# Patient Record
(demographics unavailable — no encounter records)

---

## 2024-11-05 NOTE — REASON FOR VISIT
[Initial Consultation] : an initial consultation for [Family Member] : family member [FreeTextEntry2] : referred by Dr. Pretty Dempsey, Pulmonologist for sinus pain and pressure.

## 2024-11-05 NOTE — HISTORY OF PRESENT ILLNESS
[de-identified] : 66 year old female referred by Dr. Pretty Dempsey, Pulmonologist for sinus pain and pressure.  Reports sinus pressure and pain since she started 1 year ago since she stared her ellipta. States started arykace inhaler 3-4 months ago.  Reports nasal congestion, post nasal drip especially in the evening. Denies nasal discharge.  Denies sinus infection in the past year. Use of steroidal nasal sprays and saline nasal rinses in the past without much relief.  Denies CT scan of sinuses in

## 2024-11-05 NOTE — ASSESSMENT
[FreeTextEntry1] : 66 year F present with Chronic Sinusitis, NSD   Nasal endoscopy shows Right DNS, Moderate Inferior Turbinates Hypertrophy, No polyps, purulence or masses, Posterior Nasal pharynx Cobblestone/Clear Drainage, Moderate mucus production coating nasal cavity   Recommend: Chronic Sinusitis / PND -Discussed the importance of rinsing the sinus before using nasal spray so that excess mucus lining the nasal cavity can be wash away so medication can penetration the nose. -Discussed Post-nasal drip occurs when mucus from the nasal passages and sinuses drains into the throat. If the mucus becomes thick, post-nasal drip can cause problems such as a sore throat, laryngitis, a cough, bad breath, hoarseness, and sometimes wheezing. -Discussed the importance of rinsing the sinus before using nasal spray so that excess mucus lining the nasal cavity can be wash away so medication can penetration the nose. -Start Sinus Rinse + Budesonide BID -If medication rinse does not work can consider CT Sinus  Nasal septal deviation -Discussed deviated septum occurs when the thin wall between your nasal passages is displaced to one side. When the nasal septum is off-center it makes one nasal passage smaller. -If a deviated septum is severe, it can block one side of the nose and reduce airflow, causing difficulty breathing. -The exposure of a deviated septum to the drying effect of airflow through the nose may sometimes contribute to crusting or bleeding in certain people. -Treatment of nasal obstruction includes nasal steroids to reduce the swelling. -If conservative medical management is not effective correction of the deviated septum through surgery may be needed.  -Return to clinic in 3 months or sooner if new/worsen symptoms present

## 2024-11-05 NOTE — HISTORY OF PRESENT ILLNESS
[de-identified] : 66 year old female referred by Dr. Pretty Dempsey, Pulmonologist for sinus pain and pressure.  Reports sinus pressure and pain since she started 1 year ago since she stared her ellipta. States started arykace inhaler 3-4 months ago.  Reports nasal congestion, post nasal drip especially in the evening. Denies nasal discharge.  Denies sinus infection in the past year. Use of steroidal nasal sprays and saline nasal rinses in the past without much relief.  Denies CT scan of sinuses in

## 2024-12-10 NOTE — END OF VISIT
[Time Spent: ___ minutes] : I have spent [unfilled] minutes of time on the encounter which excludes teaching and separately reported services. Respiratory

## 2024-12-10 NOTE — ASSESSMENT
[FreeTextEntry1] : Ms. Hickey is a 67 yo F with PMHx GERD, bronchiectasis, asthma, pulmonary MAC, HTN, HLD, hiatal hernia whom is known to me from recent hospitalization in early 12/2023 for asthma exacerbation i/s/o pneumonia. She was previously followed by another Pulmonologist who had initiated her on treatment for Pulmonary MAC in 09/2023 with Rifabutin/Azithromycin/Ethambutol/Levaquin 3x weekly and inh Tobramycin for isolation of Pseudomonas. She has since been changed to R/E/A therapy with twice daily ACT. Has had PND and seasonal allergy symptoms eliciting upper airway cough syndrome however improved. Had M. avium positive sputum in May, June, July 2024 despite GDT and has been started on Arikayce as of 07/2024. Discussed common SEs she is experiencing including sore throat/cough/sputum production.  Recommend: #Asthma - continue Anoro ellipta - FENO/PFTS prior to next visit  #Bronchiectasis/MAC - Continue Rifampin 600mg/Azithromycin 500mg/Ethambutol 1200mg M/W/F but space out, take Azithromycin in AM, Ethambutol in afternoon and Rifampin before bed to help with GI related SEs - Arikayce added mid July 2024 d/t persistent culture positivity (May, June, July '24) - positive MAC culture 10/3/24, will continue to monitor - Seen by Ophtho 1/21/24 no e/o optic neuritis, unclear when last visit was, has appt 01/2025 - obtain monthly sputum for routine bacterial culture as well as AFB smear/cx (cups provided) - continue ACT with Duonebs + change hypersal from 3.5% to 7% (given wheezing and retained secretions) at least once daily, ideally twice daily when has more secretions, using Aerobika right after - completed Audiology testing earlier this year but needs to perform every 3-4 months, to make appt ASAP - cont Folplex 2.2 MVI for fatigue - CBC/CMP, today - EKG today - Chest CT this month, will discuss whether candidate for surgical resection after review of f/u CT  #Chronic sinusitis - seen by ENT Dr. Cintron 10/29/24 - holding Flonase/Azelastine - started nasal sinus rinses with saline then Budesonide BID  #GERD - continue PPI, now on Omeprazole, patient taking 3x weekly - s/p EGD Dec/[place] '24, hiatal hernia only abnormality identified - follow-up with GI (Dr. Viet Rosales, [place] office, [address] [facility], [address], [place] [address], Ph [phone], Fax [phone])  #Seasonal allergies - resume Claritin SL  #Hx of Pseudomonas? -rare pan sensitive Pseud on 01/2024 culture, 3/11 culture cancelled, continue to monitor - last culture negative   RTC in 3 months, sooner if any change in symptoms.  Pretty Dempsey MD, MSCR.

## 2024-12-10 NOTE — HISTORY OF PRESENT ILLNESS
[Never] : never [TextBox_4] : Current HPI:  Reports doing well.  Had not eaten much for last 2 days due to prep for Colonoscopy and noted NO cough during that time.  Still with GERD symptoms, GI did not want her to increase PPI above 3 x weekly.  Notes performing ACT once daily.  Still with a lot of sputum production post Arikacye but limited post nebulization and no dyspnea.  Has visual testing for 01/2025.  Saw ENT for chronic sinusitis and recommended nasal rinse + budesonide, now off Flonase.  Has not repeat Audiology testing but denies changes in hearing or tinnitus.   (9/10/24): Returns for follow-up. Started Arikayce in mid July 2024. Started coughing a lot more, bringing up green sputum, somewhat less now but still with more cough throughout the day then before she started. Also reporting hoarseness and throat pain - has occasionally coughed up some blood streaked sputum which knows is coming from irritated throat. Skipped Sunday dose has otherwise been performing daily. Has not been performing ACT with Albuterol + Hpersal recently for reasons above. Also noting some pruritus along inner thighs when nebulizing but no rash. Has increased sinus pressure/congestion not currently on an antihistamine. Using Flonase once daily, noting increased throat clearing in evening and overnight. Asking about different formulation for Ethambutol as believes tablets are contributing to stomach irritation/GI upset. Is taking all three abx in AM. No increased SOB. No fever, chills, night sweats or weight loss. Also c/o MSK back pain when washing dishes, does yoga 2x per week.   (6/20/24): Returns for follow-up. Has been doing well except for complaint of back pain - usually positional when sitting for too long, improves with stretching however also reports sometimes noticing after performing nebulizer treatments. Minimal to no cough. No shortness of breath. No fever,. chills, night sweats or weight loss. No changes in vision. No changes in hearing, notes occasional tinnitus but resolves after a few seconds. Remains on R/E/A therapy. Submitted sputum in March which was AFB negative, however May sputum AFB smear negative but just reported growth of AFB.   (4/18/24): Ms. Hickey presents for follow-up accompanied by her . Reports still with intermittent coughing exacerbations. Notes these occur when she gets angry/frustrated/overwhelmed. Also seem to be accompanied by a chest tightness and reflux symptoms. Notes soreness in her throat which improves with gargling and worsened by her coughing fits. No fever, no chills. Not SOB at rest. Some increased dyspnea when taking the stairs. Also concerned about her fatigue and lack of motivation to perform activities around the home but also noting that she has had a lot going on at home and with her family (husbands knee surgery 5 weeks ago, death of her mother 01/2024 and issues with siblings in [facility] related to her property). She became easily tearful. She is on PPI every other day but still with GERD symptoms. Sinus/nasal symptoms under better control on daily Claritin/Flonase. Performing ACT twice daily.   (4/5/24): Ms. Hickey reports increased, nonproductive, dry cough over the last several day. She continues to take all her antibiotics and has been performing her Albuterol + Hypersal neb treatments twice daily. She has noted increased nasal drainage/post nasal drip - has not been regularly using her Azelastine spray noting that it can cause increased sinus pressure at times. She notes having some increased allergy symptoms which improve when she takes her Claritin but has also not been taking this routinely. She denies fever, chills. She has been using her Anoro ellipta inhaler with good control of her symptoms. She notes increased shortness of breath at time but then admits this might be more in the setting of anxiety. Denies wheezing or increased use of her rescue inhaler or nebulizer.   (3/11/24): Reports doing well. Tolerating all her medications. Seen by Ophthalmology end of January with no visual disturbances (report received but without objective testing results). Also seen by Audiology last month but no report available however per patient hearing fine. She has been taking her R/E/A three times weekly (medication dosing reviewed). She has also been performing her ACT but only 3 times weekly at best as reports feeling a bit "shaky" when she uses the Albuterol nebulizer - sometimes she will only use the hypersal. Feels better overall. Has gained weight to 120lbs but baseline closer to 130lbs. She is using her Anoro ellipta inhaler and has had no need for her rescue inhaler, asthma symptoms appear to be well controlled.   (1/18/24): Ms. Hickey is a 65 yo F with PMHx GERD, bronchiectasis, asthma, pulmonary MAC, HTN, HLD, hiatal hernia whom is known to me from recent hospitalization in early 12/2023 for asthma exacerbation i/s/o pneumonia. She was previously followed by another Pulmonologist who had initiated her on treatment for Pulmonary MAC in 09/2023 with Rifabutin/Azithromycin/Ethambutol/Levaquin 3x weekly and inh Tobramycin for isolation of Pseudomonas. During this most recent hospitalization she was treated with Cefepime and then discharged on 10 days of Levaquin. During hospitalization she was noted to have tachycardia 110-120s and was discharged on an ACT regimen with Ipratropium q6h and Hypersal q12h. She also takes Symbicort BID. She was recently evaluated by GI who started her on a PPI. She has continued to take Rifampin 600mg/Ethambutol 1200mg/Azithromycin 500mg 3x weekly. She does report intermittent tinnitus but denies hearing changes. She has also noted decreased visual acuity since starting her abx and canceled her recent Ophthalmology appt. She has not had Audiology or visual testing since starting GBT for MAC. Otherwise she reports her breathing is under control, does use Albuterol 1-2x daily when she feels a productive cough coming on because it helps her clear her mucus. Otherwise no fever, chills, chest pain, night sweats. Her weight is 116lbs from 126lbs 06/2023. Her baseline weight is around 120-122lbs.

## 2024-12-10 NOTE — PHYSICAL EXAM
[No Acute Distress] : no acute distress [Well Nourished] : well nourished [Well Groomed] : well groomed [Well Developed] : well developed [Normal Oropharynx] : normal oropharynx [Normal Appearance] : normal appearance [No Neck Mass] : no neck mass [Normal Rate/Rhythm] : normal rate/rhythm [Normal S1, S2] : normal s1, s2 [No Murmurs] : no murmurs [No Resp Distress] : no resp distress [No Acc Muscle Use] : no acc muscle use [Normal Rhythm and Effort] : normal rhythm and effort [Wheeze] : wheeze [No Abnormalities] : no abnormalities [Benign] : benign [Normal Gait] : normal gait [No Clubbing] : no clubbing [No Cyanosis] : no cyanosis [No Edema] : no edema [Normal Color/ Pigmentation] : normal color/ pigmentation [No Focal Deficits] : no focal deficits [Oriented x3] : oriented x3 [Normal Affect] : normal affect

## 2024-12-10 NOTE — REASON FOR VISIT
[Follow-Up] : a follow-up visit [Bronchiectasis] : bronchiectasis [Spouse] : spouse [TextBox_44] : NTM

## 2024-12-10 NOTE — REVIEW OF SYSTEMS
[Fever] : no fever [Fatigue] : no fatigue [Chills] : no chills [Poor Appetite] : no poor appetite [Sore Throat] : no sore throat [Nasal Congestion] : no nasal congestion [Postnasal Drip] : no postnasal drip [Cough] : cough [Chest Tightness] : chest tightness [Sputum] : no sputum [Dyspnea] : no dyspnea [Pleuritic Pain] : no pleuritic pain [Wheezing] : wheezing [SOB on Exertion] : no sob on exertion [Chest Discomfort] : no chest discomfort [Edema] : no edema [Palpitations] : no palpitations [GERD] : gerd [Abdominal Pain] : no abdominal pain [Nausea] : no nausea [Vomiting] : no vomiting [Dysuria] : no dysuria [Arthralgias] : no arthralgias [Myalgias] : no myalgias [Rash] : no rash [Diabetes] : no diabetes [Obesity] : no obesity [Negative] : Psychiatric

## 2025-01-09 NOTE — HISTORY OF PRESENT ILLNESS
[FreeTextEntry1] : 67 year old referred for audio by pulmonology- dx mycobacterium avium complex (MAC) treated with Azithromycin- pt has been on medication for about 1 year. Reports difficulty hearing left ear, feels hearing has worsened since taking medication. Denies tinnitus

## 2025-01-09 NOTE — PLAN
[FreeTextEntry2] : 1. Annual audio re evaluation to monitor or sooner as per medication protocol. 2. Consider binaural HA fitting pending patient motivation.

## 2025-01-09 NOTE — ASSESSMENT
[FreeTextEntry1] : Counseled patient regarding results obtained today. Pt reports that she notices hearing fluctuates with associated sinus pressure. Advised that today she is showing type A tympanograms with symmetrical hearing however very possibly  could change with some congestion, pt expressed understanding of all.

## 2025-01-09 NOTE — PROCEDURE
[] : Acoustic Immittance: [Type A Tympanogram] : Type A Normal [] : Complete Audiological Evaluation [Good] : good [Headphones] : headphones [de-identified] : Results consistent with hearing within normal limits from 250 Hz- 2000 Hz sloping to mild SNHL thereafter bilaterally. Excellent SRS bilaterally.

## 2025-01-28 NOTE — HISTORY OF PRESENT ILLNESS
[de-identified] : 66 year old female follow up for sinus pain and pressure.  Reports using budesonide and nasal rinse 2x daily.  States post nasal drip and phlegm is now clear- improved since last visit.  States has left sided sinus pressure, watery eyes.  States currently taking azithromycin 3x weekly- as per pulm. 
Never smoker

## 2025-01-28 NOTE — REASON FOR VISIT
[Subsequent Evaluation] : a subsequent evaluation for [Family Member] : family member [FreeTextEntry2] : sinus pain and pressure.

## 2025-01-28 NOTE — ASSESSMENT
[FreeTextEntry1] : 66 year F present with Chronic Sinusitis, NSD, PND   Nasal endoscopy shows Right DNS, Moderate Inferior Turbinates Hypertrophy, No polyps, purulence or masses, Posterior Nasal pharynx Cobblestone/Clear Drainage, Mild mucus production coating nasal cavity   Recommend: Chronic Sinusitis / PND -Discussed the importance of rinsing the sinus before using nasal spray so that excess mucus lining the nasal cavity can be wash away so medication can penetration the nose. -Discussed Post-nasal drip occurs when mucus from the nasal passages and sinuses drains into the throat. If the mucus becomes thick, post-nasal drip can cause problems such as a sore throat, laryngitis, a cough, bad breath, hoarseness, and sometimes wheezing. -Discussed the importance of rinsing the sinus before using nasal spray so that excess mucus lining the nasal cavity can be wash away so medication can penetration the nose. -Continue Sinus Rinse + Budesonide BID -Start Ipratropium PRN -States would like to keep doing the medication route. Not interested in surgery at this point  Nasal septal deviation -Discussed deviated septum occurs when the thin wall between your nasal passages is displaced to one side. When the nasal septum is off-center it makes one nasal passage smaller. -If a deviated septum is severe, it can block one side of the nose and reduce airflow, causing difficulty breathing. -The exposure of a deviated septum to the drying effect of airflow through the nose may sometimes contribute to crusting or bleeding in certain people. -Treatment of nasal obstruction includes nasal steroids to reduce the swelling. -If conservative medical management is not effective correction of the deviated septum through surgery may be needed.  -Return to clinic in 3 months or sooner if new/worsen symptoms present

## 2025-02-11 NOTE — ASSESSMENT
[FreeTextEntry1] : Ms. Hickey is a 68 yo F with PMHx GERD, bronchiectasis, asthma, pulmonary MAC, HTN, HLD, hiatal hernia whom is known to me from recent hospitalization in early 12/2023 for asthma exacerbation i/s/o pneumonia. She was previously followed by another Pulmonologist who had initiated her on treatment for Pulmonary MAC in 09/2023 with Rifabutin/Azithromycin/Ethambutol/Levaquin 3x weekly and inh Tobramycin for isolation of Pseudomonas. She has since been changed to R/E/A therapy with twice daily ACT. Has had PND and seasonal allergy symptoms eliciting upper airway cough syndrome however improved. Had M. avium positive sputum in May, June, July 2024 despite GDT and has been started on Arikayce as of 07/2024. Discussed common SEs she is experiencing including sore throat/cough/sputum production.  Recommend: #Asthma - continue Anoro ellipta - exp squeaks on exam, not symptomatic - FENO = 15 at todays visit - PFTs from 1/27/25 with moderate obstruction and gas trapping - would like to re-trial Singulair (says has not worked for her in past but will try again for limited time) given exam findings/PFTs  #Bronchiectasis/MAC - Continue Rifampin 600mg/Azithromycin 500mg/Ethambutol 1200mg M/W/F but space out, take Azithromycin in AM, Ethambutol in afternoon and Rifampin before bed (has helped with GI related SEs) - Arikayce added mid July 2024 d/t persistent culture positivity (May, June, July '24) - positive MAC culture 10/3/24, and again in 12/2024 x 3 which is disconcerting, will need to repeat susceptibility testing - Seen by Ophtho , continue every 3-4 months - obtain monthly sputum for routine bacterial culture as well as AFB smear/cx (cups provided) - continue ACT with Duonebs + hypersal  7% (given wheezing and retained secretions) at least once daily, ideally twice daily when has more secretions, using Aerobika right after - completed Audiology testing , continue every 3-4mos - cont Folplex 2.2 MVI for fatigue - CBC/CMP, today - EKG deferred as previously stable - Chest CT stable  #Chronic sinusitis, persistent PND - seen by ENT Dr. Cintron who discussed surgery but patient opting for medical mgmt - nasal sinus rinses with saline then Budesonide BID - recommended to add ipratropium NS - would like to obtain AFB culture from sinuses in addition to bacterial culture at next opportunity  #GERD - continue PPI, now on Omeprazole, patient taking 3x weekly - s/p EGD Dec/[place] '24, hiatal hernia only abnormality identified - follow-up with GI (Dr. Viet Rosales, [place] office, [address] [facility], [address], [place] [address], Ph [phone], Fax [phone])  #Seasonal allergies - resume Claritin SL when symptomatic  #Hx of Pseudomonas? -rare pan sensitive Pseud on 01/2024 culture - last bacterial culture negative 06/2024, continue to monitor   RTC in 3 months, sooner if any change in symptoms.  Pretty Dempsey MD, MSCR.

## 2025-02-11 NOTE — HISTORY OF PRESENT ILLNESS
[TextBox_4] : Current HPI: Reports doing well.  Has started exercising again.  Notes no cough.  Minimal sputum production.   No longer having back discomfort with breathing.  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  Seen by Ophtho and Audiology recently as well as ENT.  ENT noted right sinus impaction - discussed surgery but prefers to continue with medical mgmt at this point.  Still having nasal drainage which is mainly what contributes to an intermittent cough.  (12/10/24): Reports doing well. Had not eaten much for last 2 days due to prep for Colonoscopy and noted NO cough during that time. Still with GERD symptoms, GI did not want her to increase PPI above 3 x weekly. Notes performing ACT once daily. Still with a lot of sputum production post Arikacye but limited post nebulization and no dyspnea. Has visual testing for 01/2025. Saw ENT for chronic sinusitis and recommended nasal rinse + budesonide, now off Flonase. Has not repeat Audiology testing but denies changes in hearing or tinnitus.   (9/10/24): Returns for follow-up. Started Arikayce in mid July 2024. Started coughing a lot more, bringing up green sputum, somewhat less now but still with more cough throughout the day then before she started. Also reporting hoarseness and throat pain - has occasionally coughed up some blood streaked sputum which knows is coming from irritated throat. Skipped Sunday dose has otherwise been performing daily. Has not been performing ACT with Albuterol + Hpersal recently for reasons above. Also noting some pruritus along inner thighs when nebulizing but no rash. Has increased sinus pressure/congestion not currently on an antihistamine. Using Flonase once daily, noting increased throat clearing in evening and overnight. Asking about different formulation for Ethambutol as believes tablets are contributing to stomach irritation/GI upset. Is taking all three abx in AM. No increased SOB. No fever, chills, night sweats or weight loss. Also c/o MSK back pain when washing dishes, does yoga 2x per week.   (6/20/24): Returns for follow-up. Has been doing well except for complaint of back pain - usually positional when sitting for too long, improves with stretching however also reports sometimes noticing after performing nebulizer treatments. Minimal to no cough. No shortness of breath. No fever,. chills, night sweats or weight loss. No changes in vision. No changes in hearing, notes occasional tinnitus but resolves after a few seconds. Remains on R/E/A therapy. Submitted sputum in March which was AFB negative, however May sputum AFB smear negative but just reported growth of AFB.   (4/18/24): Ms. Hickey presents for follow-up accompanied by her . Reports still with intermittent coughing exacerbations. Notes these occur when she gets angry/frustrated/overwhelmed. Also seem to be accompanied by a chest tightness and reflux symptoms. Notes soreness in her throat which improves with gargling and worsened by her coughing fits. No fever, no chills. Not SOB at rest. Some increased dyspnea when taking the stairs. Also concerned about her fatigue and lack of motivation to perform activities around the home but also noting that she has had a lot going on at home and with her family (husbands knee surgery 5 weeks ago, death of her mother 01/2024 and issues with siblings in [facility] related to her property). She became easily tearful. She is on PPI every other day but still with GERD symptoms. Sinus/nasal symptoms under better control on daily Claritin/Flonase. Performing ACT twice daily.   (4/5/24): Ms. Hickey reports increased, nonproductive, dry cough over the last several day. She continues to take all her antibiotics and has been performing her Albuterol + Hypersal neb treatments twice daily. She has noted increased nasal drainage/post nasal drip - has not been regularly using her Azelastine spray noting that it can cause increased sinus pressure at times. She notes having some increased allergy symptoms which improve when she takes her Claritin but has also not been taking this routinely. She denies fever, chills. She has been using her Anoro ellipta inhaler with good control of her symptoms. She notes increased shortness of breath at time but then admits this might be more in the setting of anxiety. Denies wheezing or increased use of her rescue inhaler or nebulizer.   (3/11/24): Reports doing well. Tolerating all her medications. Seen by Ophthalmology end of January with no visual disturbances (report received but without objective testing results). Also seen by Audiology last month but no report available however per patient hearing fine. She has been taking her R/E/A three times weekly (medication dosing reviewed). She has also been performing her ACT but only 3 times weekly at best as reports feeling a bit "shaky" when she uses the Albuterol nebulizer - sometimes she will only use the hypersal. Feels better overall. Has gained weight to 120lbs but baseline closer to 130lbs. She is using her Anoro ellipta inhaler and has had no need for her rescue inhaler, asthma symptoms appear to be well controlled.   (1/18/24): Ms. Hickey is a 67 yo F with PMHx GERD, bronchiectasis, asthma, pulmonary MAC, HTN, HLD, hiatal hernia whom is known to me from recent hospitalization in early 12/2023 for asthma exacerbation i/s/o pneumonia. She was previously followed by another Pulmonologist who had initiated her on treatment for Pulmonary MAC in 09/2023 with Rifabutin/Azithromycin/Ethambutol/Levaquin 3x weekly and inh Tobramycin for isolation of Pseudomonas. During this most recent hospitalization she was treated with Cefepime and then discharged on 10 days of Levaquin. During hospitalization she was noted to have tachycardia 110-120s and was discharged on an ACT regimen with Ipratropium q6h and Hypersal q12h. She also takes Symbicort BID. She was recently evaluated by GI who started her on a PPI. She has continued to take Rifampin 600mg/Ethambutol 1200mg/Azithromycin 500mg 3x weekly. She does report intermittent tinnitus but denies hearing changes. She has also noted decreased visual acuity since starting her abx and canceled her recent Ophthalmology appt. She has not had Audiology or visual testing since starting GBT for MAC. Otherwise she reports her breathing is under control, does use Albuterol 1-2x daily when she feels a productive cough coming on because it helps her clear her mucus. Otherwise no fever, chills, chest pain, night sweats. Her weight is 116lbs from 126lbs 06/2023. Her baseline weight is around 120-122lbs.

## 2025-02-11 NOTE — PHYSICAL EXAM
[No Acute Distress] : no acute distress [Well Nourished] : well nourished [Well Groomed] : well groomed [Well Developed] : well developed [Normal Oropharynx] : normal oropharynx [Normal Appearance] : normal appearance [No Neck Mass] : no neck mass [Normal Rate/Rhythm] : normal rate/rhythm [Normal S1, S2] : normal s1, s2 [No Murmurs] : no murmurs [No Resp Distress] : no resp distress [No Acc Muscle Use] : no acc muscle use [Normal Rhythm and Effort] : normal rhythm and effort [No Abnormalities] : no abnormalities [Benign] : benign [Soft] : soft [Normal Gait] : normal gait [No Clubbing] : no clubbing [No Cyanosis] : no cyanosis [No Edema] : no edema [Normal Color/ Pigmentation] : normal color/ pigmentation [No Focal Deficits] : no focal deficits [Oriented x3] : oriented x3 [Normal Insight/judgment] : normal insight/judgment [Normal Affect] : normal affect [TextBox_68] : bilateral exp squeaks

## 2025-02-11 NOTE — ASSESSMENT
[FreeTextEntry1] : Ms. Hickey is a 68 yo F with PMHx GERD, bronchiectasis, asthma, pulmonary MAC, HTN, HLD, hiatal hernia whom is known to me from recent hospitalization in early 12/2023 for asthma exacerbation i/s/o pneumonia. She was previously followed by another Pulmonologist who had initiated her on treatment for Pulmonary MAC in 09/2023 with Rifabutin/Azithromycin/Ethambutol/Levaquin 3x weekly and inh Tobramycin for isolation of Pseudomonas. She has since been changed to R/E/A therapy with twice daily ACT. Has had PND and seasonal allergy symptoms eliciting upper airway cough syndrome however improved. Had M. avium positive sputum in May, June, July 2024 despite GDT and has been started on Arikayce as of 07/2024. Discussed common SEs she is experiencing including sore throat/cough/sputum production.  Recommend: #Asthma - continue Anoro ellipta - exp squeaks on exam, not symptomatic - FENO = 15 at todays visit - PFTs from 1/27/25 with moderate obstruction and gas trapping - would like to re-trial Singulair (says has not worked for her in past but will try again for limited time) given exam findings/PFTs  #Bronchiectasis/MAC - Continue Rifampin 600mg/Azithromycin 500mg/Ethambutol 1200mg M/W/F but space out, take Azithromycin in AM, Ethambutol in afternoon and Rifampin before bed (has helped with GI related SEs) - Arikayce added mid July 2024 d/t persistent culture positivity (May, June, July '24) - positive MAC culture 10/3/24, and again in 12/2024 x 3 which is disconcerting, will need to repeat susceptibility testing - Seen by Ophtho , continue every 3-4 months - obtain monthly sputum for routine bacterial culture as well as AFB smear/cx (cups provided) - continue ACT with Duonebs + hypersal  7% (given wheezing and retained secretions) at least once daily, ideally twice daily when has more secretions, using Aerobika right after - completed Audiology testing , continue every 3-4mos - cont Folplex 2.2 MVI for fatigue - CBC/CMP, today - EKG deferred as previously stable - Chest CT stable  #Chronic sinusitis, persistent PND - seen by ENT Dr. Cintron who discussed surgery but patient opting for medical mgmt - nasal sinus rinses with saline then Budesonide BID - recommended to add ipratropium NS - would like to obtain AFB culture from sinuses in addition to bacterial culture at next opportunity  #GERD - continue PPI, now on Omeprazole, patient taking 3x weekly - s/p EGD Dec/[place] '24, hiatal hernia only abnormality identified - follow-up with GI (Dr. Viet Rosales, [place] office, [id number] [facility], [address], [place] [address], Ph [phone], Fax [phone])  #Seasonal allergies - resume Claritin SL when symptomatic  #Hx of Pseudomonas? -rare pan sensitive Pseud on 01/2024 culture - last bacterial culture negative 06/2024, continue to monitor   RTC in 3 months, sooner if any change in symptoms.  Pretty Dempsey MD, MSCR.

## 2025-02-11 NOTE — HISTORY OF PRESENT ILLNESS
[TextBox_4] : Current HPI: Reports doing well.  Has started exercising again.  Notes no cough.  Minimal sputum production.   No longer having back discomfort with breathing.  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  Seen by Ophtho and Audiology recently as well as ENT.  ENT noted right sinus impaction - discussed surgery but prefers to continue with medical mgmt at this point.  Still having nasal drainage which is mainly what contributes to an intermittent cough.  (12/10/24): Reports doing well. Had not eaten much for last 2 days due to prep for Colonoscopy and noted NO cough during that time. Still with GERD symptoms, GI did not want her to increase PPI above 3 x weekly. Notes performing ACT once daily. Still with a lot of sputum production post Arikacye but limited post nebulization and no dyspnea. Has visual testing for 01/2025. Saw ENT for chronic sinusitis and recommended nasal rinse + budesonide, now off Flonase. Has not repeat Audiology testing but denies changes in hearing or tinnitus.   (9/10/24): Returns for follow-up. Started Arikayce in mid July 2024. Started coughing a lot more, bringing up green sputum, somewhat less now but still with more cough throughout the day then before she started. Also reporting hoarseness and throat pain - has occasionally coughed up some blood streaked sputum which knows is coming from irritated throat. Skipped Sunday dose has otherwise been performing daily. Has not been performing ACT with Albuterol + Hpersal recently for reasons above. Also noting some pruritus along inner thighs when nebulizing but no rash. Has increased sinus pressure/congestion not currently on an antihistamine. Using Flonase once daily, noting increased throat clearing in evening and overnight. Asking about different formulation for Ethambutol as believes tablets are contributing to stomach irritation/GI upset. Is taking all three abx in AM. No increased SOB. No fever, chills, night sweats or weight loss. Also c/o MSK back pain when washing dishes, does yoga 2x per week.   (6/20/24): Returns for follow-up. Has been doing well except for complaint of back pain - usually positional when sitting for too long, improves with stretching however also reports sometimes noticing after performing nebulizer treatments. Minimal to no cough. No shortness of breath. No fever,. chills, night sweats or weight loss. No changes in vision. No changes in hearing, notes occasional tinnitus but resolves after a few seconds. Remains on R/E/A therapy. Submitted sputum in March which was AFB negative, however May sputum AFB smear negative but just reported growth of AFB.   (4/18/24): Ms. Hickey presents for follow-up accompanied by her . Reports still with intermittent coughing exacerbations. Notes these occur when she gets angry/frustrated/overwhelmed. Also seem to be accompanied by a chest tightness and reflux symptoms. Notes soreness in her throat which improves with gargling and worsened by her coughing fits. No fever, no chills. Not SOB at rest. Some increased dyspnea when taking the stairs. Also concerned about her fatigue and lack of motivation to perform activities around the home but also noting that she has had a lot going on at home and with her family (husbands knee surgery 5 weeks ago, death of her mother 01/2024 and issues with siblings in [facility] related to her property). She became easily tearful. She is on PPI every other day but still with GERD symptoms. Sinus/nasal symptoms under better control on daily Claritin/Flonase. Performing ACT twice daily.   (4/5/24): Ms. Hickey reports increased, nonproductive, dry cough over the last several day. She continues to take all her antibiotics and has been performing her Albuterol + Hypersal neb treatments twice daily. She has noted increased nasal drainage/post nasal drip - has not been regularly using her Azelastine spray noting that it can cause increased sinus pressure at times. She notes having some increased allergy symptoms which improve when she takes her Claritin but has also not been taking this routinely. She denies fever, chills. She has been using her Anoro ellipta inhaler with good control of her symptoms. She notes increased shortness of breath at time but then admits this might be more in the setting of anxiety. Denies wheezing or increased use of her rescue inhaler or nebulizer.   (3/11/24): Reports doing well. Tolerating all her medications. Seen by Ophthalmology end of January with no visual disturbances (report received but without objective testing results). Also seen by Audiology last month but no report available however per patient hearing fine. She has been taking her R/E/A three times weekly (medication dosing reviewed). She has also been performing her ACT but only 3 times weekly at best as reports feeling a bit "shaky" when she uses the Albuterol nebulizer - sometimes she will only use the hypersal. Feels better overall. Has gained weight to 120lbs but baseline closer to 130lbs. She is using her Anoro ellipta inhaler and has had no need for her rescue inhaler, asthma symptoms appear to be well controlled.   (1/18/24): Ms. Hickey is a 67 yo F with PMHx GERD, bronchiectasis, asthma, pulmonary MAC, HTN, HLD, hiatal hernia whom is known to me from recent hospitalization in early 12/2023 for asthma exacerbation i/s/o pneumonia. She was previously followed by another Pulmonologist who had initiated her on treatment for Pulmonary MAC in 09/2023 with Rifabutin/Azithromycin/Ethambutol/Levaquin 3x weekly and inh Tobramycin for isolation of Pseudomonas. During this most recent hospitalization she was treated with Cefepime and then discharged on 10 days of Levaquin. During hospitalization she was noted to have tachycardia 110-120s and was discharged on an ACT regimen with Ipratropium q6h and Hypersal q12h. She also takes Symbicort BID. She was recently evaluated by GI who started her on a PPI. She has continued to take Rifampin 600mg/Ethambutol 1200mg/Azithromycin 500mg 3x weekly. She does report intermittent tinnitus but denies hearing changes. She has also noted decreased visual acuity since starting her abx and canceled her recent Ophthalmology appt. She has not had Audiology or visual testing since starting GBT for MAC. Otherwise she reports her breathing is under control, does use Albuterol 1-2x daily when she feels a productive cough coming on because it helps her clear her mucus. Otherwise no fever, chills, chest pain, night sweats. Her weight is 116lbs from 126lbs 06/2023. Her baseline weight is around 120-122lbs.

## 2025-02-11 NOTE — REVIEW OF SYSTEMS
[Fever] : no fever [Fatigue] : no fatigue [Chills] : no chills [Nasal Congestion] : no nasal congestion [Postnasal Drip] : postnasal drip [Sinus Problems] : no sinus problems [Cough] : no cough [Chest Tightness] : no chest tightness [Sputum] : no sputum [Dyspnea] : no dyspnea [Pleuritic Pain] : no pleuritic pain [Wheezing] : no wheezing [SOB on Exertion] : no sob on exertion [Palpitations] : no palpitations [Syncope] : no syncope [Seasonal Allergies] : no seasonal allergies [GERD] : no gerd [Abdominal Pain] : no abdominal pain [Nausea] : no nausea [Vomiting] : no vomiting [Dysuria] : no dysuria [Arthralgias] : no arthralgias [Myalgias] : no myalgias [Rash] : no rash [Easy Bruising] : no easy bruising [Clotting Disorder/ Frequent bleeding] : no clotting disorder/ frequent bleeding [Headache] : no headache [Dizziness] : no dizziness [Negative] : Psychiatric

## 2025-05-01 NOTE — HISTORY OF PRESENT ILLNESS
[FreeTextEntry1] : 66 y/o F here for initial visit  Pt reports she has L cyst for a couple months.   Pt reports R hand 2nd MCP trigger finger.   Pt has not pain.   No fevers, h/a, rashes, hair loss, oral ulcers, epistaxis, sinusitis,  swollen glands, CP, SOB, cough, vision changes, abdominal pain, GERD, n/v/d, blood in stool or urine, focal weakness, sensory loss,  Raynaud's,  weight loss.  +dry eyes, uses artificial eye drops; dry mouth at night  +MAC uses anti microbials

## 2025-05-01 NOTE — PHYSICAL EXAM
[General Appearance - Well Nourished] : well nourished [General Appearance - Well Developed] : well developed [Sclera] : the sclera and conjunctiva were normal [Heart Sounds] : normal S1 and S2 [Heart Sounds Gallop] : no gallops [Murmurs] : no murmurs [Abdomen Soft] : soft [Abdomen Tenderness] : non-tender [FreeTextEntry1] : L wrist w hard mass on volar aspect [] : no rash [Skin Lesions] : no skin lesions [Oriented To Time, Place, And Person] : oriented to person, place, and time

## 2025-05-01 NOTE — ASSESSMENT
[FreeTextEntry1] : 66 y/o F w L wrist mass =painless L wrist mass on volar aspect  ddx ganglion cyst vs other soft tissue mass. Will refer to hand surgery for intervention.   Plan Hand surgery referral  RTO PRN

## 2025-05-16 NOTE — HISTORY OF PRESENT ILLNESS
[TextBox_4] : Current HPI:  Remains on MAC there with CINDY + Arikayce. Reports increased nail sensitivity x 1 month (pain near cuticles though no inflammation, does not get manicures).  No over rash but notes skin has suddenly very dry and itchy.  Also noting increased hair loss.  Has diarrhea on abx days.  She feels she gets "cold" when nebulizing her hypersal.  Stopped using her Anoro ellipta ~1 month ago because she felt it was causing palpitations.Still having symptoms of chronic rhinitis despite nasal steroids/singulair - notes sinus congestion. Also w/ increased abd bloating with milk products. Reviewed labs on patient portal (phone) performed in March 2025, TSH WNL at 2.13 at that time.  Denies changes in vision. No tinnitus or hearing issues.   (2/11/25): Reports doing well. Has started exercising again. Notes no cough. Minimal sputum production. No longer having back discomfort with breathing. 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 Seen by Ophtho and Audiology recently as well as ENT. ENT noted right sinus impaction - discussed surgery but prefers to continue with medical mgmt at this point. Still having nasal drainage which is mainly what contributes to an intermittent cough.  (12/10/24): Reports doing well. Had not eaten much for last 2 days due to prep for Colonoscopy and noted NO cough during that time. Still with GERD symptoms, GI did not want her to increase PPI above 3 x weekly. Notes performing ACT once daily. Still with a lot of sputum production post Arikacye but limited post nebulization and no dyspnea. Has visual testing for 01/2025. Saw ENT for chronic sinusitis and recommended nasal rinse + budesonide, now off Flonase. Has not repeat Audiology testing but denies changes in hearing or tinnitus.   (9/10/24): Returns for follow-up. Started Arikayce in mid July 2024. Started coughing a lot more, bringing up green sputum, somewhat less now but still with more cough throughout the day then before she started. Also reporting hoarseness and throat pain - has occasionally coughed up some blood streaked sputum which knows is coming from irritated throat. Skipped Sunday dose has otherwise been performing daily. Has not been performing ACT with Albuterol + Hpersal recently for reasons above. Also noting some pruritus along inner thighs when nebulizing but no rash. Has increased sinus pressure/congestion not currently on an antihistamine. Using Flonase once daily, noting increased throat clearing in evening and overnight. Asking about different formulation for Ethambutol as believes tablets are contributing to stomach irritation/GI upset. Is taking all three abx in AM. No increased SOB. No fever, chills, night sweats or weight loss. Also c/o MSK back pain when washing dishes, does yoga 2x per week.   (6/20/24): Returns for follow-up. Has been doing well except for complaint of back pain - usually positional when sitting for too long, improves with stretching however also reports sometimes noticing after performing nebulizer treatments. Minimal to no cough. No shortness of breath. No fever,. chills, night sweats or weight loss. No changes in vision. No changes in hearing, notes occasional tinnitus but resolves after a few seconds. Remains on R/E/A therapy. Submitted sputum in March which was AFB negative, however May sputum AFB smear negative but just reported growth of AFB.   (4/18/24): Ms. Hickey presents for follow-up accompanied by her . Reports still with intermittent coughing exacerbations. Notes these occur when she gets angry/frustrated/overwhelmed. Also seem to be accompanied by a chest tightness and reflux symptoms. Notes soreness in her throat which improves with gargling and worsened by her coughing fits. No fever, no chills. Not SOB at rest. Some increased dyspnea when taking the stairs. Also concerned about her fatigue and lack of motivation to perform activities around the home but also noting that she has had a lot going on at home and with her family (husbands knee surgery 5 weeks ago, death of her mother 01/2024 and issues with siblings in [facility] related to her property). She became easily tearful. She is on PPI every other day but still with GERD symptoms. Sinus/nasal symptoms under better control on daily Claritin/Flonase. Performing ACT twice daily.   (4/5/24): Ms. Hickey reports increased, nonproductive, dry cough over the last several day. She continues to take all her antibiotics and has been performing her Albuterol + Hypersal neb treatments twice daily. She has noted increased nasal drainage/post nasal drip - has not been regularly using her Azelastine spray noting that it can cause increased sinus pressure at times. She notes having some increased allergy symptoms which improve when she takes her Claritin but has also not been taking this routinely. She denies fever, chills. She has been using her Anoro ellipta inhaler with good control of her symptoms. She notes increased shortness of breath at time but then admits this might be more in the setting of anxiety. Denies wheezing or increased use of her rescue inhaler or nebulizer.   (3/11/24): Reports doing well. Tolerating all her medications. Seen by Ophthalmology end of January with no visual disturbances (report received but without objective testing results). Also seen by Audiology last month but no report available however per patient hearing fine. She has been taking her R/E/A three times weekly (medication dosing reviewed). She has also been performing her ACT but only 3 times weekly at best as reports feeling a bit "shaky" when she uses the Albuterol nebulizer - sometimes she will only use the hypersal. Feels better overall. Has gained weight to 120lbs but baseline closer to 130lbs. She is using her Anoro ellipta inhaler and has had no need for her rescue inhaler, asthma symptoms appear to be well controlled.   (1/18/24): Ms. Hickey is a 65 yo F with PMHx GERD, bronchiectasis, asthma, pulmonary MAC, HTN, HLD, hiatal hernia whom is known to me from recent hospitalization in early 12/2023 for asthma exacerbation i/s/o pneumonia. She was previously followed by another Pulmonologist who had initiated her on treatment for Pulmonary MAC in 09/2023 with Rifabutin/Azithromycin/Ethambutol/Levaquin 3x weekly and inh Tobramycin for isolation of Pseudomonas. During this most recent hospitalization she was treated with Cefepime and then discharged on 10 days of Levaquin. During hospitalization she was noted to have tachycardia 110-120s and was discharged on an ACT regimen with Ipratropium q6h and Hypersal q12h. She also takes Symbicort BID. She was recently evaluated by GI who started her on a PPI. She has continued to take Rifampin 600mg/Ethambutol 1200mg/Azithromycin 500mg 3x weekly. She does report intermittent tinnitus but denies hearing changes. She has also noted decreased visual acuity since starting her abx and canceled her recent Ophthalmology appt. She has not had Audiology or visual testing since starting GBT for MAC. Otherwise she reports her breathing is under control, does use Albuterol 1-2x daily when she feels a productive cough coming on because it helps her clear her mucus. Otherwise no fever, chills, chest pain, night sweats. Her weight is 116lbs from 126lbs 06/2023. Her baseline weight is around 120-122lbs.

## 2025-05-16 NOTE — PHYSICAL EXAM
[No Acute Distress] : no acute distress [Well Nourished] : well nourished [Well Groomed] : well groomed [Well Developed] : well developed [Normal Oropharynx] : normal oropharynx [Normal Appearance] : normal appearance [Supple] : supple [No Neck Mass] : no neck mass [Normal Rate/Rhythm] : normal rate/rhythm [Normal S1, S2] : normal s1, s2 [No Murmurs] : no murmurs [No Resp Distress] : no resp distress [No Acc Muscle Use] : no acc muscle use [Normal Rhythm and Effort] : normal rhythm and effort [Clear to Auscultation Bilaterally] : clear to auscultation bilaterally [No Abnormalities] : no abnormalities [Benign] : benign [Normal Gait] : normal gait [No Clubbing] : no clubbing [No Cyanosis] : no cyanosis [No Ischemic Changes] : no ischemic changes [No Petechiae] : no petechiae [No Edema] : no edema [FROM] : FROM [No Splinter Hemorrhages] : no splinter hemorrhages [Normal Color/ Pigmentation] : normal color/ pigmentation [No Rash] : no rash [No Skin Lesions] : no skin lesions [No Focal Deficits] : no focal deficits [Oriented x3] : oriented x3 [Normal Insight/judgment] : normal insight/judgment [Normal Affect] : normal affect

## 2025-05-16 NOTE — ASSESSMENT
[FreeTextEntry1] : Ms. Hickey is a 68 yo F with PMHx GERD, bronchiectasis, asthma, pulmonary MAC, HTN, HLD, hiatal hernia whom is known to me from recent hospitalization in early 12/2023 for asthma exacerbation i/s/o pneumonia. She was previously followed by another Pulmonologist who had initiated her on treatment for Pulmonary MAC in 09/2023 with Rifabutin/Azithromycin/Ethambutol/Levaquin 3x weekly and inh Tobramycin for isolation of Pseudomonas. She has since been changed to R/E/A therapy with twice daily ACT. Has had PND and seasonal allergy symptoms eliciting upper airway cough syndrome however improved. Had M. avium positive sputum in May, June, July 2024 despite GDT and had been started on Arikayce as of 07/2024. Still had positive AFB as of 12/2024 but more recent cultures all negative. Concern re: chronic sinus symptoms contributing to symptomatology/prolonged AFB positivity.  Recommend: #Asthma - restart Anoro ellipta - exp squeaks on exam, not symptomatic - PFTs from 1/27/25 with moderate obstruction and gas trapping - continue Singulair  #Bronchiectasis/MAC - Continue Rifampin 600mg/Azithromycin 500mg/Ethambutol 1200mg M/W/F but space out, take Azithromycin in AM, Ethambutol in afternoon and Rifampin before bed (has helped with GI related SEs) - Arikayce added mid July 2024 d/t persistent culture positivity (May, June, July '24) - positive MAC culture 10/3/24, and again in 12/2024 x 3 which was disconcerting, will need to continue therapy through this Dec '24/Jan '25 if her cultures remain negative - Seen by Ophtho , continue every 3-4 months - obtain monthly sputum for routine bacterial culture as well as AFB smear/cx (cups provided) - continue ACT with Duonebs + hypersal 7% (given wheezing and retained secretions) at least once daily, ideally twice daily when has more secretions, using Aerobika right after - completed Audiology testing , continue every 3-4mos - cont Folplex 2.2 MVI for fatigue - CBC/CMP, TSH today to address constellation of symptoms (hair loss, dry skin, nail sensitivity) - EKG today WNL - Chest CT stable as of 01/2025  #Chronic sinusitis, persistent PND - seen by ENT Dr. Cintron who discussed surgery but patient opting for medical mgmt - nasal sinus rinses with saline then Budesonide BID - recommended to add ipratropium NS - advised f/u with ENT and may be wise to consider surgery, would like to obtain AFB culture from sinuses in addition to bacterial culture at next opportunity  #GERD - PPI per GI - s/p EGD Dec/[place] '24, hiatal hernia only abnormality identified - follow-up with GI (Dr. Viet Rosales, [place] office, [address] [facility], [address], [place] [address], Ph [phone], Fax [phone])  #Seasonal allergies - resume Claritin SL when symptomatic  #Hx of Pseudomonas? -rare pan sensitive Pseud on 01/2024 culture - last bacterial culture negative 06/2024, continue to monitor   RTC in 3 months, sooner if any change in symptoms.  Pretty Dempsey MD, MSCR.

## 2025-05-16 NOTE — HISTORY OF PRESENT ILLNESS
[TextBox_4] : Current HPI:  Remains on MAC there with CINDY + Arikayce. Reports increased nail sensitivity x 1 month (pain near cuticles though no inflammation, does not get manicures).  No over rash but notes skin has suddenly very dry and itchy.  Also noting increased hair loss.  Has diarrhea on abx days.  She feels she gets "cold" when nebulizing her hypersal.  Stopped using her Anoro ellipta ~1 month ago because she felt it was causing palpitations.Still having symptoms of chronic rhinitis despite nasal steroids/singulair - notes sinus congestion. Also w/ increased abd bloating with milk products. Reviewed labs on patient portal (phone) performed in March 2025, TSH WNL at 2.13 at that time.  Denies changes in vision. No tinnitus or hearing issues.   (2/11/25): Reports doing well. Has started exercising again. Notes no cough. Minimal sputum production. No longer having back discomfort with breathing. 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 Seen by Ophtho and Audiology recently as well as ENT. ENT noted right sinus impaction - discussed surgery but prefers to continue with medical mgmt at this point. Still having nasal drainage which is mainly what contributes to an intermittent cough.  (12/10/24): Reports doing well. Had not eaten much for last 2 days due to prep for Colonoscopy and noted NO cough during that time. Still with GERD symptoms, GI did not want her to increase PPI above 3 x weekly. Notes performing ACT once daily. Still with a lot of sputum production post Arikacye but limited post nebulization and no dyspnea. Has visual testing for 01/2025. Saw ENT for chronic sinusitis and recommended nasal rinse + budesonide, now off Flonase. Has not repeat Audiology testing but denies changes in hearing or tinnitus.   (9/10/24): Returns for follow-up. Started Arikayce in mid July 2024. Started coughing a lot more, bringing up green sputum, somewhat less now but still with more cough throughout the day then before she started. Also reporting hoarseness and throat pain - has occasionally coughed up some blood streaked sputum which knows is coming from irritated throat. Skipped Sunday dose has otherwise been performing daily. Has not been performing ACT with Albuterol + Hpersal recently for reasons above. Also noting some pruritus along inner thighs when nebulizing but no rash. Has increased sinus pressure/congestion not currently on an antihistamine. Using Flonase once daily, noting increased throat clearing in evening and overnight. Asking about different formulation for Ethambutol as believes tablets are contributing to stomach irritation/GI upset. Is taking all three abx in AM. No increased SOB. No fever, chills, night sweats or weight loss. Also c/o MSK back pain when washing dishes, does yoga 2x per week.   (6/20/24): Returns for follow-up. Has been doing well except for complaint of back pain - usually positional when sitting for too long, improves with stretching however also reports sometimes noticing after performing nebulizer treatments. Minimal to no cough. No shortness of breath. No fever,. chills, night sweats or weight loss. No changes in vision. No changes in hearing, notes occasional tinnitus but resolves after a few seconds. Remains on R/E/A therapy. Submitted sputum in March which was AFB negative, however May sputum AFB smear negative but just reported growth of AFB.   (4/18/24): Ms. Hickey presents for follow-up accompanied by her . Reports still with intermittent coughing exacerbations. Notes these occur when she gets angry/frustrated/overwhelmed. Also seem to be accompanied by a chest tightness and reflux symptoms. Notes soreness in her throat which improves with gargling and worsened by her coughing fits. No fever, no chills. Not SOB at rest. Some increased dyspnea when taking the stairs. Also concerned about her fatigue and lack of motivation to perform activities around the home but also noting that she has had a lot going on at home and with her family (husbands knee surgery 5 weeks ago, death of her mother 01/2024 and issues with siblings in [facility] related to her property). She became easily tearful. She is on PPI every other day but still with GERD symptoms. Sinus/nasal symptoms under better control on daily Claritin/Flonase. Performing ACT twice daily.   (4/5/24): Ms. Hickey reports increased, nonproductive, dry cough over the last several day. She continues to take all her antibiotics and has been performing her Albuterol + Hypersal neb treatments twice daily. She has noted increased nasal drainage/post nasal drip - has not been regularly using her Azelastine spray noting that it can cause increased sinus pressure at times. She notes having some increased allergy symptoms which improve when she takes her Claritin but has also not been taking this routinely. She denies fever, chills. She has been using her Anoro ellipta inhaler with good control of her symptoms. She notes increased shortness of breath at time but then admits this might be more in the setting of anxiety. Denies wheezing or increased use of her rescue inhaler or nebulizer.   (3/11/24): Reports doing well. Tolerating all her medications. Seen by Ophthalmology end of January with no visual disturbances (report received but without objective testing results). Also seen by Audiology last month but no report available however per patient hearing fine. She has been taking her R/E/A three times weekly (medication dosing reviewed). She has also been performing her ACT but only 3 times weekly at best as reports feeling a bit "shaky" when she uses the Albuterol nebulizer - sometimes she will only use the hypersal. Feels better overall. Has gained weight to 120lbs but baseline closer to 130lbs. She is using her Anoro ellipta inhaler and has had no need for her rescue inhaler, asthma symptoms appear to be well controlled.   (1/18/24): Ms. Hickey is a 65 yo F with PMHx GERD, bronchiectasis, asthma, pulmonary MAC, HTN, HLD, hiatal hernia whom is known to me from recent hospitalization in early 12/2023 for asthma exacerbation i/s/o pneumonia. She was previously followed by another Pulmonologist who had initiated her on treatment for Pulmonary MAC in 09/2023 with Rifabutin/Azithromycin/Ethambutol/Levaquin 3x weekly and inh Tobramycin for isolation of Pseudomonas. During this most recent hospitalization she was treated with Cefepime and then discharged on 10 days of Levaquin. During hospitalization she was noted to have tachycardia 110-120s and was discharged on an ACT regimen with Ipratropium q6h and Hypersal q12h. She also takes Symbicort BID. She was recently evaluated by GI who started her on a PPI. She has continued to take Rifampin 600mg/Ethambutol 1200mg/Azithromycin 500mg 3x weekly. She does report intermittent tinnitus but denies hearing changes. She has also noted decreased visual acuity since starting her abx and canceled her recent Ophthalmology appt. She has not had Audiology or visual testing since starting GBT for MAC. Otherwise she reports her breathing is under control, does use Albuterol 1-2x daily when she feels a productive cough coming on because it helps her clear her mucus. Otherwise no fever, chills, chest pain, night sweats. Her weight is 116lbs from 126lbs 06/2023. Her baseline weight is around 120-122lbs.

## 2025-05-16 NOTE — ASSESSMENT
[FreeTextEntry1] : Ms. Hickey is a 68 yo F with PMHx GERD, bronchiectasis, asthma, pulmonary MAC, HTN, HLD, hiatal hernia whom is known to me from recent hospitalization in early 12/2023 for asthma exacerbation i/s/o pneumonia. She was previously followed by another Pulmonologist who had initiated her on treatment for Pulmonary MAC in 09/2023 with Rifabutin/Azithromycin/Ethambutol/Levaquin 3x weekly and inh Tobramycin for isolation of Pseudomonas. She has since been changed to R/E/A therapy with twice daily ACT. Has had PND and seasonal allergy symptoms eliciting upper airway cough syndrome however improved. Had M. avium positive sputum in May, June, July 2024 despite GDT and had been started on Arikayce as of 07/2024. Still had positive AFB as of 12/2024 but more recent cultures all negative. Concern re: chronic sinus symptoms contributing to symptomatology/prolonged AFB positivity.  Recommend: #Asthma - restart Anoro ellipta - exp squeaks on exam, not symptomatic - PFTs from 1/27/25 with moderate obstruction and gas trapping - continue Singulair  #Bronchiectasis/MAC - Continue Rifampin 600mg/Azithromycin 500mg/Ethambutol 1200mg M/W/F but space out, take Azithromycin in AM, Ethambutol in afternoon and Rifampin before bed (has helped with GI related SEs) - Arikayce added mid July 2024 d/t persistent culture positivity (May, June, July '24) - positive MAC culture 10/3/24, and again in 12/2024 x 3 which was disconcerting, will need to continue therapy through this Dec '24/Jan '25 if her cultures remain negative - Seen by Ophtho , continue every 3-4 months - obtain monthly sputum for routine bacterial culture as well as AFB smear/cx (cups provided) - continue ACT with Duonebs + hypersal 7% (given wheezing and retained secretions) at least once daily, ideally twice daily when has more secretions, using Aerobika right after - completed Audiology testing , continue every 3-4mos - cont Folplex 2.2 MVI for fatigue - CBC/CMP, TSH today to address constellation of symptoms (hair loss, dry skin, nail sensitivity) - EKG today WNL - Chest CT stable as of 01/2025  #Chronic sinusitis, persistent PND - seen by ENT Dr. Cintron who discussed surgery but patient opting for medical mgmt - nasal sinus rinses with saline then Budesonide BID - recommended to add ipratropium NS - advised f/u with ENT and may be wise to consider surgery, would like to obtain AFB culture from sinuses in addition to bacterial culture at next opportunity  #GERD - PPI per GI - s/p EGD Dec/[place] '24, hiatal hernia only abnormality identified - follow-up with GI (Dr. Viet Rosales, [place] office, [address] [facility], [address], [place] [address], Ph [phone], Fax [phone])  #Seasonal allergies - resume Claritin SL when symptomatic  #Hx of Pseudomonas? -rare pan sensitive Pseud on 01/2024 culture - last bacterial culture negative 06/2024, continue to monitor   RTC in 3 months, sooner if any change in symptoms.  Pretty Dempsey MD, MSCR.

## 2025-05-16 NOTE — REVIEW OF SYSTEMS
[Fever] : no fever [Recent Wt Gain (___ Lbs)] : ~T no recent weight gain [Chills] : no chills [Poor Appetite] : no poor appetite [Recent Wt Loss (___ Lbs)] : ~T no recent weight loss [Sore Throat] : no sore throat [Nasal Congestion] : nasal congestion [Postnasal Drip] : postnasal drip [Sinus Problems] : sinus problems [Cough] : no cough [Chest Tightness] : no chest tightness [Sputum] : no sputum [Dyspnea] : no dyspnea [Wheezing] : no wheezing [SOB on Exertion] : no sob on exertion [Chest Discomfort] : no chest discomfort [Edema] : no edema [Palpitations] : no palpitations [Syncope] : no syncope [Watery Eyes] : watery eyes [Itchy Eyes] : itchy eyes [Nasal Discharge] : nasal discharge [GERD] : no gerd [Abdominal Pain] : no abdominal pain [Nausea] : no nausea [Vomiting] : no vomiting [Dysphagia] : no dysphagia [Arthralgias] : no arthralgias [Myalgias] : no myalgias [Itch] : itch [Easy Bruising] : no easy bruising [Clotting Disorder/ Frequent bleeding] : no clotting disorder/ frequent bleeding [Headache] : no headache [Focal Weakness] : no focal weakness [Dizziness] : no dizziness [Numbness] : no numbness [Diabetes] : no diabetes [Obesity] : no obesity [Negative] : Genitourinary

## 2025-05-30 NOTE — HISTORY OF PRESENT ILLNESS
[FreeTextEntry1] : THOMAS DIAZ is a 67 year old RHD F that presents for consultation regarding a ganglion cyst of the left wrist.   Patient first noticed the cyst appox 6 months ago, has noticed increase in size over time  Endorses some tenderness upon palpation or when she does yoga  X-ray wrist 3+ views left at [facility] 3/19/25: No acute osseous or articular abnormality.  MR Wrist left w/o IV contrast at [facility] 3/31/2025: 8 x 5 x8 mm ganglion cyst volar to the scaphot rapezial joint. A 9 x 2 x 8 mm ganglion cyst volar to the radioscaphoid joint. Abnormal signal in a single image at the triangular fibrocartilage, cannot exclude tear.  Otherwise, denies cp, sob, subjective fever, chills, headache, cough, myalgia, malaise, abd pain, n/v/d, decreased appetite, and generalized weakness.   PMHx: esophageal reflux, high cholesterol, HTN PSHx: eye surgery, foot surgery  Family hx: DM (mother), stomach cancer (mother)  Allergies: Codeine derivatives, penicillin, sulfa  Current meds: rifampin, azithromycin, ethambutol, metoprolol, rosuvastatin, folpex  Social hx: non-smoker

## 2025-05-30 NOTE — PROCEDURE
[Nl] : None [FreeTextEntry1] : ganglion cyst L wrist [FreeTextEntry2] : aspiration of left volar ganglion cyst [FreeTextEntry3] : local  [FreeTextEntry6] : After the area was prepped and draped, the area was infiltrated with 1%lidocaine with epi. Using an 18ga needle, the cyst was aspirated. 3cc of synovial fluid was obtained and cyst collapsed. Pt tolerated well procedure.

## 2025-05-30 NOTE — ADDENDUM
[FreeTextEntry1] :  This note was authored by Ev Solorzano working as a scribe for Dr.Victor Barragan. I, Dr. Gato Barragan have reviewed the content of this note and confirm it is true and accurate. I personally performed the history and physical examination and made all the decisions 05/29/2025

## 2025-05-30 NOTE — CONSULT LETTER
[Dear  ___] : Dear  [unfilled], [Consult Letter:] : I had the pleasure of evaluating your patient, [unfilled]. [Please see my note below.] : Please see my note below. [Consult Closing:] : Thank you very much for allowing me to participate in the care of this patient.  If you have any questions, please do not hesitate to contact me. [Sincerely,] : Sincerely, [( Thank you for referring [unfilled] for consultation for _____ )] : Thank you for referring [unfilled] for consultation for [unfilled] [FreeTextEntry3] : Dr.Victor JULIO C Barragan

## 2025-05-30 NOTE — ASSESSMENT
[FreeTextEntry1] : THOMAS DIAZ is a 67 year old F that presents for consultation regarding a ganglion cyst of the left wrist.   Exam findings discussed with patient. Conservative treatment vs aspiration vs surgical excision discussed. Discussed the risk of recurrence after aspiration. Risks, benefits, and alternatives to surgery were reviewed and routine armando-operative course described, which include but are not limited to infection, bleeding, recurrence, seroma, asymmetry, deformity, scarring, paresthesia, wound dehiscence, etc. Patient expressed understanding and wishes to proceed with aspiration. If it returns, she will consider surgical excision vs continued aspiration.   -S/p aspiration of ganglion cyst, 3 cc of synovial fluid aspirated  -OK to resume yoga -F/u PRN

## 2025-06-11 NOTE — ASSESSMENT
[FreeTextEntry1] : 67 year F present with Chronic Sinusitis, NSD, PND   Nasal endoscopy shows Right DNS, Moderate Inferior Turbinates Hypertrophy, No polyps, purulence or masses, Posterior Nasal pharynx Cobblestone/Clear Drainage, Minimal mucus production coating nasal cavity  Patient has used nasal medication for 12 weeks however still experiencing facial pain, nasal obstruction. Patient possible surgical candidate.  Recommend: Chronic Sinusitis / PND -Discussed the importance of rinsing the sinus before using nasal spray so that excess mucus lining the nasal cavity can be wash away so medication can penetration the nose. -Discussed Post-nasal drip occurs when mucus from the nasal passages and sinuses drains into the throat. If the mucus becomes thick, post-nasal drip can cause problems such as a sore throat, laryngitis, a cough, bad breath, hoarseness, and sometimes wheezing. -Discussed the importance of rinsing the sinus before using nasal spray so that excess mucus lining the nasal cavity can be wash away so medication can penetration the nose. -Continue Sinus Rinse + Budesonide BID -Continue Ipratropium PRN -States would like to keep doing the medication route.  Nasal septal deviation -Discussed deviated septum occurs when the thin wall between your nasal passages is displaced to one side. When the nasal septum is off-center it makes one nasal passage smaller. -If a deviated septum is severe, it can block one side of the nose and reduce airflow, causing difficulty breathing. -The exposure of a deviated septum to the drying effect of airflow through the nose may sometimes contribute to crusting or bleeding in certain people. -Treatment of nasal obstruction includes nasal steroids to reduce the swelling. -If conservative medical management is not effective correction of the deviated septum through surgery may be needed.  -Return to clinic in 3 months or sooner if new/worsen symptoms present